# Patient Record
Sex: FEMALE | Race: WHITE | NOT HISPANIC OR LATINO | Employment: FULL TIME | URBAN - METROPOLITAN AREA
[De-identification: names, ages, dates, MRNs, and addresses within clinical notes are randomized per-mention and may not be internally consistent; named-entity substitution may affect disease eponyms.]

---

## 2018-11-27 ENCOUNTER — HOSPITAL ENCOUNTER (EMERGENCY)
Facility: HOSPITAL | Age: 38
Discharge: HOME | End: 2018-11-27
Attending: EMERGENCY MEDICINE | Admitting: EMERGENCY MEDICINE

## 2018-11-27 VITALS
DIASTOLIC BLOOD PRESSURE: 81 MMHG | RESPIRATION RATE: 18 BRPM | TEMPERATURE: 97.6 F | WEIGHT: 186 LBS | BODY MASS INDEX: 28.19 KG/M2 | SYSTOLIC BLOOD PRESSURE: 144 MMHG | HEART RATE: 88 BPM | HEIGHT: 68 IN | OXYGEN SATURATION: 96 %

## 2018-11-27 DIAGNOSIS — Z86.69 HISTORY OF CHIARI MALFORMATION: ICD-10-CM

## 2018-11-27 DIAGNOSIS — M43.6 TORTICOLLIS, ACUTE: Primary | ICD-10-CM

## 2018-11-27 PROCEDURE — 99282 EMERGENCY DEPT VISIT SF MDM: CPT

## 2018-11-27 PROCEDURE — 96372 THER/PROPH/DIAG INJ SC/IM: CPT

## 2018-11-27 PROCEDURE — 25010000002 KETOROLAC TROMETHAMINE PER 15 MG: Performed by: PHYSICIAN ASSISTANT

## 2018-11-27 RX ORDER — KETOROLAC TROMETHAMINE 30 MG/ML
60 INJECTION, SOLUTION INTRAMUSCULAR; INTRAVENOUS ONCE
Status: COMPLETED | OUTPATIENT
Start: 2018-11-27 | End: 2018-11-27

## 2018-11-27 RX ORDER — ONDANSETRON 4 MG/1
4 TABLET, ORALLY DISINTEGRATING ORAL EVERY 8 HOURS PRN
Qty: 12 TABLET | Refills: 0 | Status: SHIPPED | OUTPATIENT
Start: 2018-11-27

## 2018-11-27 RX ORDER — CYCLOBENZAPRINE HCL 10 MG
10 TABLET ORAL 3 TIMES DAILY PRN
Qty: 12 TABLET | Refills: 0 | Status: SHIPPED | OUTPATIENT
Start: 2018-11-27 | End: 2018-12-06

## 2018-11-27 RX ORDER — METHYLPREDNISOLONE 4 MG/1
TABLET ORAL
Qty: 21 TABLET | Refills: 0 | Status: SHIPPED | OUTPATIENT
Start: 2018-11-27 | End: 2019-04-25

## 2018-11-27 RX ADMIN — KETOROLAC TROMETHAMINE 60 MG: 30 INJECTION, SOLUTION INTRAMUSCULAR at 15:54

## 2019-04-25 ENCOUNTER — APPOINTMENT (OUTPATIENT)
Dept: GENERAL RADIOLOGY | Facility: HOSPITAL | Age: 39
End: 2019-04-25

## 2019-04-25 ENCOUNTER — HOSPITAL ENCOUNTER (INPATIENT)
Facility: HOSPITAL | Age: 39
LOS: 3 days | Discharge: LEFT BEFORE DISCHARGE | End: 2019-04-28
Attending: EMERGENCY MEDICINE | Admitting: SURGERY

## 2019-04-25 ENCOUNTER — APPOINTMENT (OUTPATIENT)
Dept: CT IMAGING | Facility: HOSPITAL | Age: 39
End: 2019-04-25

## 2019-04-25 DIAGNOSIS — R31.9 URINARY TRACT INFECTION WITH HEMATURIA, SITE UNSPECIFIED: ICD-10-CM

## 2019-04-25 DIAGNOSIS — K63.9 THICKENED SMALL BOWEL: ICD-10-CM

## 2019-04-25 DIAGNOSIS — K35.32 RUPTURED APPENDICITIS: ICD-10-CM

## 2019-04-25 DIAGNOSIS — A41.9 SEPSIS, DUE TO UNSPECIFIED ORGANISM: Primary | ICD-10-CM

## 2019-04-25 DIAGNOSIS — N39.0 URINARY TRACT INFECTION WITH HEMATURIA, SITE UNSPECIFIED: ICD-10-CM

## 2019-04-25 DIAGNOSIS — K57.92 DIVERTICULITIS: ICD-10-CM

## 2019-04-25 DIAGNOSIS — K66.8 FREE INTRAPERITONEAL AIR: ICD-10-CM

## 2019-04-25 PROBLEM — K35.80 ACUTE APPENDICITIS: Status: ACTIVE | Noted: 2019-04-25

## 2019-04-25 PROBLEM — J44.9 COPD (CHRONIC OBSTRUCTIVE PULMONARY DISEASE) (HCC): Status: ACTIVE | Noted: 2019-04-25

## 2019-04-25 PROBLEM — B19.20 HEPATITIS C: Status: ACTIVE | Noted: 2019-04-25

## 2019-04-25 PROBLEM — Z72.0 TOBACCO ABUSE: Status: ACTIVE | Noted: 2019-04-25

## 2019-04-25 LAB
ALBUMIN SERPL-MCNC: 3.9 G/DL (ref 3.5–5.2)
ALBUMIN/GLOB SERPL: 1.2 G/DL
ALP SERPL-CCNC: 165 U/L (ref 39–117)
ALT SERPL W P-5'-P-CCNC: 552 U/L (ref 1–33)
ANION GAP SERPL CALCULATED.3IONS-SCNC: 13 MMOL/L
AST SERPL-CCNC: 180 U/L (ref 1–32)
BACTERIA UR QL AUTO: ABNORMAL /HPF
BASOPHILS # BLD AUTO: 0.01 10*3/MM3 (ref 0–0.2)
BASOPHILS NFR BLD AUTO: 0 % (ref 0–1.5)
BILIRUB SERPL-MCNC: 2.3 MG/DL (ref 0.2–1.2)
BILIRUB UR QL STRIP: ABNORMAL
BUN BLD-MCNC: 16 MG/DL (ref 6–20)
BUN/CREAT SERPL: 27.6 (ref 7–25)
CALCIUM SPEC-SCNC: 9.6 MG/DL (ref 8.6–10.5)
CHLORIDE SERPL-SCNC: 102 MMOL/L (ref 98–107)
CLARITY UR: ABNORMAL
CO2 SERPL-SCNC: 26 MMOL/L (ref 22–29)
COLOR UR: ABNORMAL
CREAT BLD-MCNC: 0.58 MG/DL (ref 0.57–1)
DEPRECATED RDW RBC AUTO: 54.7 FL (ref 37–54)
EOSINOPHIL # BLD AUTO: 0.01 10*3/MM3 (ref 0–0.4)
EOSINOPHIL NFR BLD AUTO: 0 % (ref 0.3–6.2)
ERYTHROCYTE [DISTWIDTH] IN BLOOD BY AUTOMATED COUNT: 15.7 % (ref 12.3–15.4)
GFR SERPL CREATININE-BSD FRML MDRD: 118 ML/MIN/1.73
GLOBULIN UR ELPH-MCNC: 3.3 GM/DL
GLUCOSE BLD-MCNC: 166 MG/DL (ref 65–99)
GLUCOSE UR STRIP-MCNC: NEGATIVE MG/DL
HCT VFR BLD AUTO: 42.2 % (ref 34–46.6)
HGB BLD-MCNC: 14.2 G/DL (ref 12–15.9)
HGB UR QL STRIP.AUTO: NEGATIVE
HOLD SPECIMEN: NORMAL
HOLD SPECIMEN: NORMAL
HYALINE CASTS UR QL AUTO: ABNORMAL /LPF
IMM GRANULOCYTES # BLD AUTO: 0.09 10*3/MM3 (ref 0–0.05)
IMM GRANULOCYTES NFR BLD AUTO: 0.4 % (ref 0–0.5)
KETONES UR QL STRIP: NEGATIVE
LEUKOCYTE ESTERASE UR QL STRIP.AUTO: ABNORMAL
LIPASE SERPL-CCNC: 7 U/L (ref 13–60)
LYMPHOCYTES # BLD AUTO: 1.48 10*3/MM3 (ref 0.7–3.1)
LYMPHOCYTES NFR BLD AUTO: 6.4 % (ref 19.6–45.3)
MCH RBC QN AUTO: 32.2 PG (ref 26.6–33)
MCHC RBC AUTO-ENTMCNC: 33.6 G/DL (ref 31.5–35.7)
MCV RBC AUTO: 95.7 FL (ref 79–97)
MONOCYTES # BLD AUTO: 1.05 10*3/MM3 (ref 0.1–0.9)
MONOCYTES NFR BLD AUTO: 4.5 % (ref 5–12)
NEUTROPHILS # BLD AUTO: 20.73 10*3/MM3 (ref 1.7–7)
NEUTROPHILS NFR BLD AUTO: 89.1 % (ref 42.7–76)
NITRITE UR QL STRIP: POSITIVE
PH UR STRIP.AUTO: <=5 [PH] (ref 5–8)
PLATELET # BLD AUTO: 226 10*3/MM3 (ref 140–450)
PMV BLD AUTO: 11.5 FL (ref 6–12)
POTASSIUM BLD-SCNC: 3.5 MMOL/L (ref 3.5–5.2)
PROCALCITONIN SERPL-MCNC: 1.42 NG/ML (ref 0.1–0.25)
PROT SERPL-MCNC: 7.2 G/DL (ref 6–8.5)
PROT UR QL STRIP: NEGATIVE
RBC # BLD AUTO: 4.41 10*6/MM3 (ref 3.77–5.28)
RBC # UR: ABNORMAL /HPF
REF LAB TEST METHOD: ABNORMAL
SODIUM BLD-SCNC: 141 MMOL/L (ref 136–145)
SP GR UR STRIP: 1.03 (ref 1–1.03)
SQUAMOUS #/AREA URNS HPF: ABNORMAL /HPF
UROBILINOGEN UR QL STRIP: ABNORMAL
WBC NRBC COR # BLD: 23.28 10*3/MM3 (ref 3.4–10.8)
WBC UR QL AUTO: ABNORMAL /HPF
WHOLE BLOOD HOLD SPECIMEN: NORMAL
WHOLE BLOOD HOLD SPECIMEN: NORMAL

## 2019-04-25 PROCEDURE — 99223 1ST HOSP IP/OBS HIGH 75: CPT | Performed by: INTERNAL MEDICINE

## 2019-04-25 PROCEDURE — 83690 ASSAY OF LIPASE: CPT | Performed by: EMERGENCY MEDICINE

## 2019-04-25 PROCEDURE — 25010000002 HYDROMORPHONE PER 4 MG: Performed by: INTERNAL MEDICINE

## 2019-04-25 PROCEDURE — 87040 BLOOD CULTURE FOR BACTERIA: CPT | Performed by: PHYSICIAN ASSISTANT

## 2019-04-25 PROCEDURE — 25010000002 HYDROMORPHONE PER 4 MG: Performed by: EMERGENCY MEDICINE

## 2019-04-25 PROCEDURE — 25010000002 LORAZEPAM PER 2 MG: Performed by: INTERNAL MEDICINE

## 2019-04-25 PROCEDURE — 25010000002 VANCOMYCIN 10 G RECONSTITUTED SOLUTION: Performed by: PHYSICIAN ASSISTANT

## 2019-04-25 PROCEDURE — 25010000002 PIPERACILLIN SOD-TAZOBACTAM PER 1 G: Performed by: INTERNAL MEDICINE

## 2019-04-25 PROCEDURE — 25010000002 ONDANSETRON PER 1 MG: Performed by: PHYSICIAN ASSISTANT

## 2019-04-25 PROCEDURE — 74177 CT ABD & PELVIS W/CONTRAST: CPT

## 2019-04-25 PROCEDURE — 25010000002 ONDANSETRON PER 1 MG: Performed by: INTERNAL MEDICINE

## 2019-04-25 PROCEDURE — 71045 X-RAY EXAM CHEST 1 VIEW: CPT

## 2019-04-25 PROCEDURE — 84145 PROCALCITONIN (PCT): CPT | Performed by: PHYSICIAN ASSISTANT

## 2019-04-25 PROCEDURE — 25010000002 FLUCONAZOLE PER 200 MG: Performed by: INTERNAL MEDICINE

## 2019-04-25 PROCEDURE — 80053 COMPREHEN METABOLIC PANEL: CPT | Performed by: EMERGENCY MEDICINE

## 2019-04-25 PROCEDURE — 25010000002 PROMETHAZINE PER 50 MG: Performed by: INTERNAL MEDICINE

## 2019-04-25 PROCEDURE — 25010000002 IOPAMIDOL 61 % SOLUTION: Performed by: EMERGENCY MEDICINE

## 2019-04-25 PROCEDURE — 25010000002 PIPERACILLIN SOD-TAZOBACTAM PER 1 G: Performed by: PHYSICIAN ASSISTANT

## 2019-04-25 PROCEDURE — 85025 COMPLETE CBC W/AUTO DIFF WBC: CPT | Performed by: EMERGENCY MEDICINE

## 2019-04-25 PROCEDURE — 99285 EMERGENCY DEPT VISIT HI MDM: CPT

## 2019-04-25 PROCEDURE — 81001 URINALYSIS AUTO W/SCOPE: CPT | Performed by: EMERGENCY MEDICINE

## 2019-04-25 RX ORDER — ONDANSETRON 2 MG/ML
4 INJECTION INTRAMUSCULAR; INTRAVENOUS ONCE
Status: COMPLETED | OUTPATIENT
Start: 2019-04-25 | End: 2019-04-25

## 2019-04-25 RX ORDER — NICOTINE 21 MG/24HR
1 PATCH, TRANSDERMAL 24 HOURS TRANSDERMAL EVERY 24 HOURS
Status: DISCONTINUED | OUTPATIENT
Start: 2019-04-25 | End: 2019-04-28 | Stop reason: HOSPADM

## 2019-04-25 RX ORDER — SODIUM CHLORIDE 0.9 % (FLUSH) 0.9 %
3 SYRINGE (ML) INJECTION EVERY 12 HOURS SCHEDULED
Status: DISCONTINUED | OUTPATIENT
Start: 2019-04-25 | End: 2019-04-26 | Stop reason: SDUPTHER

## 2019-04-25 RX ORDER — SODIUM CHLORIDE 0.9 % (FLUSH) 0.9 %
3-10 SYRINGE (ML) INJECTION AS NEEDED
Status: DISCONTINUED | OUTPATIENT
Start: 2019-04-25 | End: 2019-04-26 | Stop reason: SDUPTHER

## 2019-04-25 RX ORDER — ERGOCALCIFEROL 1.25 MG/1
50000 CAPSULE ORAL
COMMUNITY

## 2019-04-25 RX ORDER — PROMETHAZINE HYDROCHLORIDE 25 MG/ML
12.5 INJECTION, SOLUTION INTRAMUSCULAR; INTRAVENOUS EVERY 6 HOURS PRN
Status: DISCONTINUED | OUTPATIENT
Start: 2019-04-25 | End: 2019-04-25

## 2019-04-25 RX ORDER — SODIUM CHLORIDE 0.9 % (FLUSH) 0.9 %
10 SYRINGE (ML) INJECTION AS NEEDED
Status: DISCONTINUED | OUTPATIENT
Start: 2019-04-25 | End: 2019-04-28 | Stop reason: HOSPADM

## 2019-04-25 RX ORDER — LORAZEPAM 2 MG/ML
1 INJECTION INTRAMUSCULAR EVERY 4 HOURS PRN
Status: DISCONTINUED | OUTPATIENT
Start: 2019-04-25 | End: 2019-04-28 | Stop reason: HOSPADM

## 2019-04-25 RX ORDER — HYDROMORPHONE HYDROCHLORIDE 1 MG/ML
0.5 INJECTION, SOLUTION INTRAMUSCULAR; INTRAVENOUS; SUBCUTANEOUS
Status: DISCONTINUED | OUTPATIENT
Start: 2019-04-25 | End: 2019-04-26 | Stop reason: SDUPTHER

## 2019-04-25 RX ORDER — ASPIRIN 81 MG/1
81 TABLET ORAL DAILY
COMMUNITY

## 2019-04-25 RX ORDER — HYDROMORPHONE HYDROCHLORIDE 1 MG/ML
0.5 INJECTION, SOLUTION INTRAMUSCULAR; INTRAVENOUS; SUBCUTANEOUS
Status: COMPLETED | OUTPATIENT
Start: 2019-04-25 | End: 2019-04-25

## 2019-04-25 RX ORDER — PREDNISONE 10 MG/1
TABLET ORAL DAILY
Status: ON HOLD | COMMUNITY
Start: 2019-04-22 | End: 2019-04-26

## 2019-04-25 RX ORDER — FLUCONAZOLE 2 MG/ML
400 INJECTION, SOLUTION INTRAVENOUS DAILY
Status: DISCONTINUED | OUTPATIENT
Start: 2019-04-25 | End: 2019-04-28

## 2019-04-25 RX ORDER — ONDANSETRON 2 MG/ML
4 INJECTION INTRAMUSCULAR; INTRAVENOUS EVERY 6 HOURS PRN
Status: DISCONTINUED | OUTPATIENT
Start: 2019-04-25 | End: 2019-04-28 | Stop reason: HOSPADM

## 2019-04-25 RX ORDER — SODIUM CHLORIDE 9 MG/ML
125 INJECTION, SOLUTION INTRAVENOUS CONTINUOUS
Status: DISCONTINUED | OUTPATIENT
Start: 2019-04-25 | End: 2019-04-28 | Stop reason: HOSPADM

## 2019-04-25 RX ADMIN — IOPAMIDOL 95 ML: 612 INJECTION, SOLUTION INTRAVENOUS at 12:41

## 2019-04-25 RX ADMIN — HYDROMORPHONE HYDROCHLORIDE 0.5 MG: 1 INJECTION, SOLUTION INTRAMUSCULAR; INTRAVENOUS; SUBCUTANEOUS at 14:15

## 2019-04-25 RX ADMIN — SODIUM CHLORIDE 2517 ML: 9 INJECTION, SOLUTION INTRAVENOUS at 11:32

## 2019-04-25 RX ADMIN — LORAZEPAM 1 MG: 2 INJECTION INTRAMUSCULAR; INTRAVENOUS at 21:56

## 2019-04-25 RX ADMIN — SODIUM CHLORIDE 100 ML/HR: 9 INJECTION, SOLUTION INTRAVENOUS at 15:38

## 2019-04-25 RX ADMIN — ONDANSETRON 4 MG: 2 INJECTION INTRAMUSCULAR; INTRAVENOUS at 18:02

## 2019-04-25 RX ADMIN — HYDROMORPHONE HYDROCHLORIDE 0.5 MG: 1 INJECTION, SOLUTION INTRAMUSCULAR; INTRAVENOUS; SUBCUTANEOUS at 21:56

## 2019-04-25 RX ADMIN — HYDROMORPHONE HYDROCHLORIDE 0.5 MG: 1 INJECTION, SOLUTION INTRAMUSCULAR; INTRAVENOUS; SUBCUTANEOUS at 17:35

## 2019-04-25 RX ADMIN — FLUCONAZOLE 400 MG: 2 INJECTION, SOLUTION INTRAVENOUS at 15:38

## 2019-04-25 RX ADMIN — TAZOBACTAM SODIUM AND PIPERACILLIN SODIUM 4.5 G: 500; 4 INJECTION, SOLUTION INTRAVENOUS at 18:57

## 2019-04-25 RX ADMIN — TAZOBACTAM SODIUM AND PIPERACILLIN SODIUM 3.38 G: 375; 3 INJECTION, SOLUTION INTRAVENOUS at 11:43

## 2019-04-25 RX ADMIN — VANCOMYCIN HYDROCHLORIDE 1250 MG: 10 INJECTION, POWDER, LYOPHILIZED, FOR SOLUTION INTRAVENOUS at 12:11

## 2019-04-25 RX ADMIN — ONDANSETRON 4 MG: 2 INJECTION INTRAMUSCULAR; INTRAVENOUS at 11:36

## 2019-04-25 RX ADMIN — HYDROMORPHONE HYDROCHLORIDE 0.5 MG: 1 INJECTION, SOLUTION INTRAMUSCULAR; INTRAVENOUS; SUBCUTANEOUS at 11:44

## 2019-04-26 ENCOUNTER — ANESTHESIA EVENT (OUTPATIENT)
Dept: PERIOP | Facility: HOSPITAL | Age: 39
End: 2019-04-26

## 2019-04-26 ENCOUNTER — ANESTHESIA (OUTPATIENT)
Dept: PERIOP | Facility: HOSPITAL | Age: 39
End: 2019-04-26

## 2019-04-26 PROBLEM — N39.0 ACUTE UTI (URINARY TRACT INFECTION): Status: ACTIVE | Noted: 2019-04-26

## 2019-04-26 PROBLEM — K52.9 ENTERITIS: Status: ACTIVE | Noted: 2019-04-26

## 2019-04-26 LAB
DEPRECATED RDW RBC AUTO: 58.1 FL (ref 37–54)
ERYTHROCYTE [DISTWIDTH] IN BLOOD BY AUTOMATED COUNT: 16.2 % (ref 12.3–15.4)
HCT VFR BLD AUTO: 37.8 % (ref 34–46.6)
HGB BLD-MCNC: 12.3 G/DL (ref 12–15.9)
MCH RBC QN AUTO: 31.8 PG (ref 26.6–33)
MCHC RBC AUTO-ENTMCNC: 32.5 G/DL (ref 31.5–35.7)
MCV RBC AUTO: 97.7 FL (ref 79–97)
PLATELET # BLD AUTO: 210 10*3/MM3 (ref 140–450)
PMV BLD AUTO: 11.4 FL (ref 6–12)
POTASSIUM BLD-SCNC: 3.3 MMOL/L (ref 3.5–5.2)
POTASSIUM BLD-SCNC: 3.7 MMOL/L (ref 3.5–5.2)
RBC # BLD AUTO: 3.87 10*6/MM3 (ref 3.77–5.28)
WBC NRBC COR # BLD: 15.36 10*3/MM3 (ref 3.4–10.8)

## 2019-04-26 PROCEDURE — 99233 SBSQ HOSP IP/OBS HIGH 50: CPT | Performed by: INTERNAL MEDICINE

## 2019-04-26 PROCEDURE — 25010000002 MORPHINE PER 10 MG: Performed by: SURGERY

## 2019-04-26 PROCEDURE — 88307 TISSUE EXAM BY PATHOLOGIST: CPT | Performed by: SURGERY

## 2019-04-26 PROCEDURE — 25010000002 PHENYLEPHRINE PER 1 ML: Performed by: NURSE ANESTHETIST, CERTIFIED REGISTERED

## 2019-04-26 PROCEDURE — 84132 ASSAY OF SERUM POTASSIUM: CPT | Performed by: NURSE PRACTITIONER

## 2019-04-26 PROCEDURE — 25010000002 BUPRENORPHINE PER 0.1 MG: Performed by: ANESTHESIOLOGY

## 2019-04-26 PROCEDURE — 93005 ELECTROCARDIOGRAM TRACING: CPT | Performed by: ANESTHESIOLOGY

## 2019-04-26 PROCEDURE — 0D1N0Z4 BYPASS SIGMOID COLON TO CUTANEOUS, OPEN APPROACH: ICD-10-PCS | Performed by: SURGERY

## 2019-04-26 PROCEDURE — 25010000002 NEOSTIGMINE 10 MG/10ML SOLUTION: Performed by: ANESTHESIOLOGY

## 2019-04-26 PROCEDURE — 25010000002 PIPERACILLIN SOD-TAZOBACTAM PER 1 G: Performed by: INTERNAL MEDICINE

## 2019-04-26 PROCEDURE — 0DJD4ZZ INSPECTION OF LOWER INTESTINAL TRACT, PERCUTANEOUS ENDOSCOPIC APPROACH: ICD-10-PCS | Performed by: SURGERY

## 2019-04-26 PROCEDURE — 25010000002 FLUCONAZOLE PER 200 MG: Performed by: INTERNAL MEDICINE

## 2019-04-26 PROCEDURE — 25010000002 FENTANYL CITRATE (PF) 100 MCG/2ML SOLUTION: Performed by: NURSE ANESTHETIST, CERTIFIED REGISTERED

## 2019-04-26 PROCEDURE — 25010000002 ONDANSETRON PER 1 MG: Performed by: INTERNAL MEDICINE

## 2019-04-26 PROCEDURE — 25010000002 PROPOFOL 1000 MG/ML EMULSION: Performed by: NURSE ANESTHETIST, CERTIFIED REGISTERED

## 2019-04-26 PROCEDURE — 25010000002 SUCCINYLCHOLINE PER 20 MG: Performed by: NURSE ANESTHETIST, CERTIFIED REGISTERED

## 2019-04-26 PROCEDURE — 25010000002 PROPOFOL 10 MG/ML EMULSION: Performed by: NURSE ANESTHETIST, CERTIFIED REGISTERED

## 2019-04-26 PROCEDURE — 84132 ASSAY OF SERUM POTASSIUM: CPT | Performed by: ANESTHESIOLOGY

## 2019-04-26 PROCEDURE — 25010000002 DEXAMETHASONE SODIUM PHOSPHATE 10 MG/ML SOLUTION: Performed by: ANESTHESIOLOGY

## 2019-04-26 PROCEDURE — 85027 COMPLETE CBC AUTOMATED: CPT | Performed by: INTERNAL MEDICINE

## 2019-04-26 PROCEDURE — 93010 ELECTROCARDIOGRAM REPORT: CPT | Performed by: INTERNAL MEDICINE

## 2019-04-26 PROCEDURE — 87086 URINE CULTURE/COLONY COUNT: CPT | Performed by: SURGERY

## 2019-04-26 PROCEDURE — 25010000002 HYDROMORPHONE PER 4 MG: Performed by: INTERNAL MEDICINE

## 2019-04-26 PROCEDURE — 0DTN0ZZ RESECTION OF SIGMOID COLON, OPEN APPROACH: ICD-10-PCS | Performed by: SURGERY

## 2019-04-26 PROCEDURE — 25010000002 METHYLNALTREXONE 12 MG/0.6ML SOLUTION: Performed by: SURGERY

## 2019-04-26 DEVICE — CONTOUR CURVED CUTTER STAPLER RELOAD
Type: IMPLANTABLE DEVICE | Site: SIGMOID COLON | Status: FUNCTIONAL
Brand: CONTOUR

## 2019-04-26 RX ORDER — LABETALOL HYDROCHLORIDE 5 MG/ML
5 INJECTION, SOLUTION INTRAVENOUS
Status: DISCONTINUED | OUTPATIENT
Start: 2019-04-26 | End: 2019-04-26 | Stop reason: HOSPADM

## 2019-04-26 RX ORDER — LIDOCAINE HYDROCHLORIDE 10 MG/ML
INJECTION, SOLUTION EPIDURAL; INFILTRATION; INTRACAUDAL; PERINEURAL AS NEEDED
Status: DISCONTINUED | OUTPATIENT
Start: 2019-04-26 | End: 2019-04-26 | Stop reason: SURG

## 2019-04-26 RX ORDER — FAMOTIDINE 10 MG/ML
20 INJECTION, SOLUTION INTRAVENOUS ONCE
Status: COMPLETED | OUTPATIENT
Start: 2019-04-26 | End: 2019-04-26

## 2019-04-26 RX ORDER — PROMETHAZINE HYDROCHLORIDE 25 MG/ML
6.25 INJECTION, SOLUTION INTRAMUSCULAR; INTRAVENOUS ONCE AS NEEDED
Status: DISCONTINUED | OUTPATIENT
Start: 2019-04-26 | End: 2019-04-26 | Stop reason: HOSPADM

## 2019-04-26 RX ORDER — SODIUM CHLORIDE 0.9 % (FLUSH) 0.9 %
3 SYRINGE (ML) INJECTION EVERY 12 HOURS SCHEDULED
Status: DISCONTINUED | OUTPATIENT
Start: 2019-04-26 | End: 2019-04-26 | Stop reason: HOSPADM

## 2019-04-26 RX ORDER — CARISOPRODOL 350 MG/1
350 TABLET ORAL EVERY 8 HOURS PRN
Status: DISCONTINUED | OUTPATIENT
Start: 2019-04-26 | End: 2019-04-28 | Stop reason: HOSPADM

## 2019-04-26 RX ORDER — MORPHINE SULFATE 1 MG/ML
INJECTION INTRAVENOUS CONTINUOUS
Status: DISCONTINUED | OUTPATIENT
Start: 2019-04-26 | End: 2019-04-28

## 2019-04-26 RX ORDER — FAMOTIDINE 20 MG/1
20 TABLET, FILM COATED ORAL ONCE
Status: CANCELLED | OUTPATIENT
Start: 2019-04-26 | End: 2019-04-26

## 2019-04-26 RX ORDER — ONDANSETRON 2 MG/ML
4 INJECTION INTRAMUSCULAR; INTRAVENOUS ONCE AS NEEDED
Status: DISCONTINUED | OUTPATIENT
Start: 2019-04-26 | End: 2019-04-26 | Stop reason: HOSPADM

## 2019-04-26 RX ORDER — SODIUM CHLORIDE 0.9 % (FLUSH) 0.9 %
3-10 SYRINGE (ML) INJECTION AS NEEDED
Status: DISCONTINUED | OUTPATIENT
Start: 2019-04-26 | End: 2019-04-26 | Stop reason: HOSPADM

## 2019-04-26 RX ORDER — PROMETHAZINE HYDROCHLORIDE 25 MG/1
25 TABLET ORAL ONCE AS NEEDED
Status: DISCONTINUED | OUTPATIENT
Start: 2019-04-26 | End: 2019-04-26 | Stop reason: HOSPADM

## 2019-04-26 RX ORDER — HYDROMORPHONE HYDROCHLORIDE 1 MG/ML
0.5 INJECTION, SOLUTION INTRAMUSCULAR; INTRAVENOUS; SUBCUTANEOUS
Status: DISCONTINUED | OUTPATIENT
Start: 2019-04-26 | End: 2019-04-26 | Stop reason: SDUPTHER

## 2019-04-26 RX ORDER — NALOXONE HCL 0.4 MG/ML
0.1 VIAL (ML) INJECTION
Status: DISCONTINUED | OUTPATIENT
Start: 2019-04-26 | End: 2019-04-28 | Stop reason: HOSPADM

## 2019-04-26 RX ORDER — FENTANYL CITRATE 50 UG/ML
50 INJECTION, SOLUTION INTRAMUSCULAR; INTRAVENOUS
Status: DISCONTINUED | OUTPATIENT
Start: 2019-04-26 | End: 2019-04-26 | Stop reason: HOSPADM

## 2019-04-26 RX ORDER — BUPIVACAINE HYDROCHLORIDE 2.5 MG/ML
INJECTION, SOLUTION EPIDURAL; INFILTRATION; INTRACAUDAL
Status: COMPLETED | OUTPATIENT
Start: 2019-04-26 | End: 2019-04-26

## 2019-04-26 RX ORDER — FENTANYL CITRATE 50 UG/ML
50 INJECTION, SOLUTION INTRAMUSCULAR; INTRAVENOUS
Status: DISCONTINUED | OUTPATIENT
Start: 2019-04-26 | End: 2019-04-26 | Stop reason: SDUPTHER

## 2019-04-26 RX ORDER — POTASSIUM CHLORIDE 1.5 G/1.77G
40 POWDER, FOR SOLUTION ORAL AS NEEDED
Status: DISCONTINUED | OUTPATIENT
Start: 2019-04-26 | End: 2019-04-28 | Stop reason: HOSPADM

## 2019-04-26 RX ORDER — LIDOCAINE HYDROCHLORIDE 10 MG/ML
INJECTION, SOLUTION EPIDURAL; INFILTRATION; INTRACAUDAL; PERINEURAL AS NEEDED
Status: DISCONTINUED | OUTPATIENT
Start: 2019-04-26 | End: 2019-04-26 | Stop reason: HOSPADM

## 2019-04-26 RX ORDER — ALBUTEROL SULFATE 90 UG/1
AEROSOL, METERED RESPIRATORY (INHALATION) AS NEEDED
Status: DISCONTINUED | OUTPATIENT
Start: 2019-04-26 | End: 2019-04-26 | Stop reason: SURG

## 2019-04-26 RX ORDER — SODIUM CHLORIDE 0.9 % (FLUSH) 0.9 %
1-10 SYRINGE (ML) INJECTION AS NEEDED
Status: DISCONTINUED | OUTPATIENT
Start: 2019-04-26 | End: 2019-04-26 | Stop reason: HOSPADM

## 2019-04-26 RX ORDER — BUPRENORPHINE HYDROCHLORIDE 0.32 MG/ML
INJECTION INTRAMUSCULAR; INTRAVENOUS
Status: COMPLETED | OUTPATIENT
Start: 2019-04-26 | End: 2019-04-26

## 2019-04-26 RX ORDER — SODIUM CHLORIDE, SODIUM LACTATE, POTASSIUM CHLORIDE, CALCIUM CHLORIDE 600; 310; 30; 20 MG/100ML; MG/100ML; MG/100ML; MG/100ML
9 INJECTION, SOLUTION INTRAVENOUS CONTINUOUS
Status: DISCONTINUED | OUTPATIENT
Start: 2019-04-26 | End: 2019-04-26

## 2019-04-26 RX ORDER — IPRATROPIUM BROMIDE AND ALBUTEROL SULFATE 2.5; .5 MG/3ML; MG/3ML
3 SOLUTION RESPIRATORY (INHALATION) ONCE AS NEEDED
Status: DISCONTINUED | OUTPATIENT
Start: 2019-04-26 | End: 2019-04-26 | Stop reason: HOSPADM

## 2019-04-26 RX ORDER — MAGNESIUM HYDROXIDE 1200 MG/15ML
LIQUID ORAL AS NEEDED
Status: DISCONTINUED | OUTPATIENT
Start: 2019-04-26 | End: 2019-04-26 | Stop reason: HOSPADM

## 2019-04-26 RX ORDER — ROCURONIUM BROMIDE 10 MG/ML
INJECTION, SOLUTION INTRAVENOUS AS NEEDED
Status: DISCONTINUED | OUTPATIENT
Start: 2019-04-26 | End: 2019-04-26 | Stop reason: SURG

## 2019-04-26 RX ORDER — POTASSIUM CHLORIDE 750 MG/1
40 CAPSULE, EXTENDED RELEASE ORAL AS NEEDED
Status: DISCONTINUED | OUTPATIENT
Start: 2019-04-26 | End: 2019-04-28 | Stop reason: HOSPADM

## 2019-04-26 RX ORDER — NALOXONE HCL 0.4 MG/ML
0.4 VIAL (ML) INJECTION AS NEEDED
Status: DISCONTINUED | OUTPATIENT
Start: 2019-04-26 | End: 2019-04-26 | Stop reason: HOSPADM

## 2019-04-26 RX ORDER — HYDROCODONE BITARTRATE AND ACETAMINOPHEN 5; 325 MG/1; MG/1
1 TABLET ORAL EVERY 6 HOURS PRN
Status: DISCONTINUED | OUTPATIENT
Start: 2019-04-26 | End: 2019-04-28 | Stop reason: HOSPADM

## 2019-04-26 RX ORDER — POTASSIUM CHLORIDE 7.45 MG/ML
10 INJECTION INTRAVENOUS
Status: DISCONTINUED | OUTPATIENT
Start: 2019-04-26 | End: 2019-04-28 | Stop reason: HOSPADM

## 2019-04-26 RX ORDER — FENTANYL CITRATE 50 UG/ML
INJECTION, SOLUTION INTRAMUSCULAR; INTRAVENOUS AS NEEDED
Status: DISCONTINUED | OUTPATIENT
Start: 2019-04-26 | End: 2019-04-26 | Stop reason: SURG

## 2019-04-26 RX ORDER — PROMETHAZINE HYDROCHLORIDE 25 MG/1
25 SUPPOSITORY RECTAL ONCE AS NEEDED
Status: DISCONTINUED | OUTPATIENT
Start: 2019-04-26 | End: 2019-04-26 | Stop reason: HOSPADM

## 2019-04-26 RX ORDER — PROPOFOL 10 MG/ML
VIAL (ML) INTRAVENOUS AS NEEDED
Status: DISCONTINUED | OUTPATIENT
Start: 2019-04-26 | End: 2019-04-26 | Stop reason: SURG

## 2019-04-26 RX ORDER — GLYCOPYRROLATE 0.2 MG/ML
INJECTION INTRAMUSCULAR; INTRAVENOUS AS NEEDED
Status: DISCONTINUED | OUTPATIENT
Start: 2019-04-26 | End: 2019-04-26 | Stop reason: SURG

## 2019-04-26 RX ORDER — SODIUM CHLORIDE 9 MG/ML
INJECTION, SOLUTION INTRAVENOUS AS NEEDED
Status: DISCONTINUED | OUTPATIENT
Start: 2019-04-26 | End: 2019-04-26 | Stop reason: HOSPADM

## 2019-04-26 RX ORDER — CELECOXIB 200 MG/1
200 CAPSULE ORAL EVERY 12 HOURS SCHEDULED
Status: DISCONTINUED | OUTPATIENT
Start: 2019-04-26 | End: 2019-04-28 | Stop reason: HOSPADM

## 2019-04-26 RX ORDER — NEOSTIGMINE METHYLSULFATE 1 MG/ML
INJECTION, SOLUTION INTRAVENOUS AS NEEDED
Status: DISCONTINUED | OUTPATIENT
Start: 2019-04-26 | End: 2019-04-26 | Stop reason: SURG

## 2019-04-26 RX ORDER — SUCCINYLCHOLINE CHLORIDE 20 MG/ML
INJECTION INTRAMUSCULAR; INTRAVENOUS AS NEEDED
Status: DISCONTINUED | OUTPATIENT
Start: 2019-04-26 | End: 2019-04-26 | Stop reason: SURG

## 2019-04-26 RX ORDER — LIDOCAINE HYDROCHLORIDE 10 MG/ML
0.5 INJECTION, SOLUTION EPIDURAL; INFILTRATION; INTRACAUDAL; PERINEURAL ONCE AS NEEDED
Status: DISCONTINUED | OUTPATIENT
Start: 2019-04-26 | End: 2019-04-26 | Stop reason: HOSPADM

## 2019-04-26 RX ORDER — HYDROMORPHONE HYDROCHLORIDE 1 MG/ML
0.5 INJECTION, SOLUTION INTRAMUSCULAR; INTRAVENOUS; SUBCUTANEOUS
Status: DISCONTINUED | OUTPATIENT
Start: 2019-04-26 | End: 2019-04-26 | Stop reason: HOSPADM

## 2019-04-26 RX ORDER — DEXAMETHASONE SODIUM PHOSPHATE 10 MG/ML
INJECTION, SOLUTION INTRAMUSCULAR; INTRAVENOUS
Status: COMPLETED | OUTPATIENT
Start: 2019-04-26 | End: 2019-04-26

## 2019-04-26 RX ORDER — HYDRALAZINE HYDROCHLORIDE 20 MG/ML
5 INJECTION INTRAMUSCULAR; INTRAVENOUS
Status: DISCONTINUED | OUTPATIENT
Start: 2019-04-26 | End: 2019-04-26 | Stop reason: HOSPADM

## 2019-04-26 RX ORDER — HYDROCODONE BITARTRATE AND ACETAMINOPHEN 5; 325 MG/1; MG/1
1 TABLET ORAL ONCE AS NEEDED
Status: DISCONTINUED | OUTPATIENT
Start: 2019-04-26 | End: 2019-04-26 | Stop reason: HOSPADM

## 2019-04-26 RX ORDER — ONDANSETRON 2 MG/ML
4 INJECTION INTRAMUSCULAR; INTRAVENOUS ONCE AS NEEDED
Status: DISCONTINUED | OUTPATIENT
Start: 2019-04-26 | End: 2019-04-26 | Stop reason: SDUPTHER

## 2019-04-26 RX ADMIN — NEOSTIGMINE METHYLSULFATE 3 MG: 1 INJECTION, SOLUTION INTRAVENOUS at 17:28

## 2019-04-26 RX ADMIN — SODIUM CHLORIDE 125 ML/HR: 9 INJECTION, SOLUTION INTRAVENOUS at 19:07

## 2019-04-26 RX ADMIN — SODIUM CHLORIDE 125 ML/HR: 9 INJECTION, SOLUTION INTRAVENOUS at 07:29

## 2019-04-26 RX ADMIN — SODIUM CHLORIDE, POTASSIUM CHLORIDE, SODIUM LACTATE AND CALCIUM CHLORIDE 9 ML/HR: 600; 310; 30; 20 INJECTION, SOLUTION INTRAVENOUS at 14:25

## 2019-04-26 RX ADMIN — METHYLNALTREXONE BROMIDE 6 MG: 12 INJECTION, SOLUTION SUBCUTANEOUS at 19:57

## 2019-04-26 RX ADMIN — SODIUM CHLORIDE, PRESERVATIVE FREE 3 ML: 5 INJECTION INTRAVENOUS at 08:26

## 2019-04-26 RX ADMIN — PROPOFOL 25 MCG/KG/MIN: 10 INJECTION, EMULSION INTRAVENOUS at 15:00

## 2019-04-26 RX ADMIN — ROCURONIUM BROMIDE 20 MG: 10 SOLUTION INTRAVENOUS at 16:34

## 2019-04-26 RX ADMIN — FAMOTIDINE 20 MG: 10 INJECTION, SOLUTION INTRAVENOUS at 14:20

## 2019-04-26 RX ADMIN — ROCURONIUM BROMIDE 20 MG: 10 SOLUTION INTRAVENOUS at 15:40

## 2019-04-26 RX ADMIN — TAZOBACTAM SODIUM AND PIPERACILLIN SODIUM 4.5 G: 500; 4 INJECTION, SOLUTION INTRAVENOUS at 09:03

## 2019-04-26 RX ADMIN — ROCURONIUM BROMIDE 5 MG: 10 SOLUTION INTRAVENOUS at 14:55

## 2019-04-26 RX ADMIN — ALBUTEROL SULFATE 3 PUFF: 90 AEROSOL, METERED RESPIRATORY (INHALATION) at 14:57

## 2019-04-26 RX ADMIN — CELECOXIB 200 MG: 200 CAPSULE ORAL at 19:57

## 2019-04-26 RX ADMIN — HYDROMORPHONE HYDROCHLORIDE 0.5 MG: 1 INJECTION, SOLUTION INTRAMUSCULAR; INTRAVENOUS; SUBCUTANEOUS at 05:33

## 2019-04-26 RX ADMIN — FENTANYL CITRATE 50 MCG: 50 INJECTION, SOLUTION INTRAMUSCULAR; INTRAVENOUS at 14:50

## 2019-04-26 RX ADMIN — MORPHINE SULFATE: 1 INJECTION INTRAVENOUS at 19:58

## 2019-04-26 RX ADMIN — FLUCONAZOLE 400 MG: 2 INJECTION, SOLUTION INTRAVENOUS at 08:25

## 2019-04-26 RX ADMIN — ONDANSETRON 4 MG: 2 INJECTION INTRAMUSCULAR; INTRAVENOUS at 10:22

## 2019-04-26 RX ADMIN — TAZOBACTAM SODIUM AND PIPERACILLIN SODIUM 4.5 G: 500; 4 INJECTION, SOLUTION INTRAVENOUS at 01:15

## 2019-04-26 RX ADMIN — SUCCINYLCHOLINE CHLORIDE 100 MG: 20 INJECTION, SOLUTION INTRAMUSCULAR; INTRAVENOUS; PARENTERAL at 14:56

## 2019-04-26 RX ADMIN — BUPRENORPHINE HYDROCHLORIDE 0.3 MG: 0.32 INJECTION INTRAMUSCULAR; INTRAVENOUS at 17:30

## 2019-04-26 RX ADMIN — SODIUM CHLORIDE 125 ML/HR: 9 INJECTION, SOLUTION INTRAVENOUS at 00:52

## 2019-04-26 RX ADMIN — ROCURONIUM BROMIDE 25 MG: 10 SOLUTION INTRAVENOUS at 15:10

## 2019-04-26 RX ADMIN — LIDOCAINE HYDROCHLORIDE 50 MG: 10 INJECTION, SOLUTION EPIDURAL; INFILTRATION; INTRACAUDAL; PERINEURAL at 14:56

## 2019-04-26 RX ADMIN — TAZOBACTAM SODIUM AND PIPERACILLIN SODIUM 4.5 G: 500; 4 INJECTION, SOLUTION INTRAVENOUS at 22:05

## 2019-04-26 RX ADMIN — ONDANSETRON 4 MG: 2 INJECTION INTRAMUSCULAR; INTRAVENOUS at 17:09

## 2019-04-26 RX ADMIN — GLYCOPYRROLATE 0.4 MG: 0.2 INJECTION, SOLUTION INTRAMUSCULAR; INTRAVENOUS at 17:28

## 2019-04-26 RX ADMIN — HYDROMORPHONE HYDROCHLORIDE 0.5 MG: 1 INJECTION, SOLUTION INTRAMUSCULAR; INTRAVENOUS; SUBCUTANEOUS at 10:11

## 2019-04-26 RX ADMIN — BUPIVACAINE HYDROCHLORIDE 60 ML: 2.5 INJECTION, SOLUTION EPIDURAL; INFILTRATION; INTRACAUDAL; PERINEURAL at 17:30

## 2019-04-26 RX ADMIN — PROPOFOL 200 MG: 10 INJECTION, EMULSION INTRAVENOUS at 14:56

## 2019-04-26 RX ADMIN — DEXAMETHASONE SODIUM PHOSPHATE 2 MG: 10 INJECTION INTRAMUSCULAR; INTRAVENOUS at 17:30

## 2019-04-26 RX ADMIN — PHENYLEPHRINE HYDROCHLORIDE 80 MCG: 10 INJECTION INTRAVENOUS at 15:04

## 2019-04-26 NOTE — ANESTHESIA PROCEDURE NOTES
Airway  Urgency: elective    Airway not difficult    General Information and Staff    Patient location during procedure: OR    Indications and Patient Condition  Indications for airway management: airway protection    Preoxygenated: yes  MILS not maintained throughout  Mask difficulty assessment: 1 - vent by mask    Final Airway Details  Final airway type: endotracheal airway      Successful airway: ETT  Cuffed: yes   Successful intubation technique: direct laryngoscopy  Endotracheal tube insertion site: oral  Blade: Patricio  Blade size: 3  ETT size (mm): 7.0  Cormack-Lehane Classification: grade I - full view of glottis  Placement verified by: chest auscultation and capnometry   Measured from: lips  ETT to lips (cm): 20  Number of attempts at approach: 1    Additional Comments  Prophylactic RSI     Negative epigastric sounds, Breath sound equal bilaterally with symmetric chest rise and fall

## 2019-04-26 NOTE — ANESTHESIA PREPROCEDURE EVALUATION
Anesthesia Evaluation     Patient summary reviewed and Nursing notes reviewed   NPO Solid Status: > 8 hours  NPO Liquid Status: > 8 hours           Airway   Mallampati: II  TM distance: >3 FB  Neck ROM: full  No difficulty expected  Dental - normal exam     Pulmonary    (+) decreased breath sounds,   Cardiovascular   Exercise tolerance: good (4-7 METS)    Rhythm: regular  Rate: normal        Neuro/Psych  GI/Hepatic/Renal/Endo    (+)   liver disease,     Musculoskeletal     Abdominal    Substance History      OB/GYN          Other        ROS/Med Hx Other: Hep c positive                  Anesthesia Plan    ASA 3 - emergent     intravenous induction   Anesthetic plan, all risks, benefits, and alternatives have been provided, discussed and informed consent has been obtained with: patient.    Notre active hepatitis

## 2019-04-26 NOTE — ANESTHESIA PROCEDURE NOTES
Peripheral Block    Pre-sedation assessment completed: 4/26/2019 5:20 PM    Patient location during procedure: OR  Start time: 4/26/2019 5:20 PM  Stop time: 4/26/2019 5:35 PM  Reason for block: at surgeon's request and post-op pain management  Performed by  Anesthesiologist: Dhaval Huang MD  CRNA: Leticia Juarez CRNA  Preanesthetic Checklist  Completed: patient identified, site marked, surgical consent, pre-op evaluation, timeout performed, IV checked, risks and benefits discussed and monitors and equipment checked  Prep:  Pt Position: supine  Sterile barriers:cap, gloves, sterile barriers and mask  Prep: ChloraPrep  Patient monitoring: blood pressure monitoring, continuous pulse oximetry and EKG  Procedure  Sedation:yes  Performed under: general  Guidance:ultrasound guided  Images:still images obtained    Laterality:Bilateral  Block Type:TAP  Injection Technique:single-shot  Needle Type:short-bevel and echogenic  Needle Gauge:20 G    Medications Used: buprenorphine (BUPRENEX) injection, 0.3 mg  dexamethasone sodium phosphate injection, 2 mg  bupivacaine PF (MARCAINE) 0.25 % injection, 60 mL  Med admintered at 4/26/2019 5:30 PM  Medications  Preservative Free Saline:10ml  Comment:Block Injection:  LA dose divided between Right and Left block       Adjuncts:  Decadron 4mg PSF, Buprenex 0.3mg (Per total volume of LA)    Post Assessment  Injection Assessment: negative aspiration for heme, incremental injection and no paresthesia on injection  Patient Tolerance:comfortable throughout block  Complications:no  Additional Notes      Under Ultrasound guidance, a BBraun 4inch 360 degree needle was advanced with Normal Saline hydro dissection of tissue.  The Internal Oblique and Transversus Abdominus muscles where visualized.  At or before the aponeurosis of Internal Oblique, local anesthetic spread was visualized in the Transversus Abdominus Plane. Injection was made incrementally with aspiration every 5 mls.   There was no  intravascular injection,  injection pressure was normal, there was no neural injection, and the procedure was completed without difficulty.  Thank You.

## 2019-04-27 LAB
ANION GAP SERPL CALCULATED.3IONS-SCNC: 9 MMOL/L
BACTERIA SPEC AEROBE CULT: NO GROWTH
BUN BLD-MCNC: 10 MG/DL (ref 6–20)
BUN/CREAT SERPL: 20.4 (ref 7–25)
CALCIUM SPEC-SCNC: 8.7 MG/DL (ref 8.6–10.5)
CHLORIDE SERPL-SCNC: 104 MMOL/L (ref 98–107)
CO2 SERPL-SCNC: 26 MMOL/L (ref 22–29)
CREAT BLD-MCNC: 0.49 MG/DL (ref 0.57–1)
DEPRECATED RDW RBC AUTO: 53.8 FL (ref 37–54)
ERYTHROCYTE [DISTWIDTH] IN BLOOD BY AUTOMATED COUNT: 15.5 % (ref 12.3–15.4)
GFR SERPL CREATININE-BSD FRML MDRD: 143 ML/MIN/1.73
GLUCOSE BLD-MCNC: 136 MG/DL (ref 65–99)
HCT VFR BLD AUTO: 33.4 % (ref 34–46.6)
HGB BLD-MCNC: 11.2 G/DL (ref 12–15.9)
MCH RBC QN AUTO: 32 PG (ref 26.6–33)
MCHC RBC AUTO-ENTMCNC: 33.5 G/DL (ref 31.5–35.7)
MCV RBC AUTO: 95.4 FL (ref 79–97)
PLATELET # BLD AUTO: 175 10*3/MM3 (ref 140–450)
PMV BLD AUTO: 11 FL (ref 6–12)
POTASSIUM BLD-SCNC: 3.7 MMOL/L (ref 3.5–5.2)
RBC # BLD AUTO: 3.5 10*6/MM3 (ref 3.77–5.28)
SODIUM BLD-SCNC: 139 MMOL/L (ref 136–145)
WBC NRBC COR # BLD: 11.47 10*3/MM3 (ref 3.4–10.8)

## 2019-04-27 PROCEDURE — 80048 BASIC METABOLIC PNL TOTAL CA: CPT | Performed by: INTERNAL MEDICINE

## 2019-04-27 PROCEDURE — 85027 COMPLETE CBC AUTOMATED: CPT | Performed by: INTERNAL MEDICINE

## 2019-04-27 PROCEDURE — 25010000002 PIPERACILLIN SOD-TAZOBACTAM PER 1 G: Performed by: INTERNAL MEDICINE

## 2019-04-27 PROCEDURE — 25010000002 FLUCONAZOLE PER 200 MG: Performed by: INTERNAL MEDICINE

## 2019-04-27 PROCEDURE — 99233 SBSQ HOSP IP/OBS HIGH 50: CPT | Performed by: INTERNAL MEDICINE

## 2019-04-27 RX ADMIN — CELECOXIB 200 MG: 200 CAPSULE ORAL at 20:26

## 2019-04-27 RX ADMIN — SODIUM CHLORIDE 125 ML/HR: 9 INJECTION, SOLUTION INTRAVENOUS at 19:34

## 2019-04-27 RX ADMIN — SODIUM CHLORIDE 125 ML/HR: 9 INJECTION, SOLUTION INTRAVENOUS at 09:15

## 2019-04-27 RX ADMIN — CELECOXIB 200 MG: 200 CAPSULE ORAL at 09:15

## 2019-04-27 RX ADMIN — TAZOBACTAM SODIUM AND PIPERACILLIN SODIUM 4.5 G: 500; 4 INJECTION, SOLUTION INTRAVENOUS at 20:27

## 2019-04-27 RX ADMIN — FLUCONAZOLE 400 MG: 2 INJECTION, SOLUTION INTRAVENOUS at 09:15

## 2019-04-27 RX ADMIN — TAZOBACTAM SODIUM AND PIPERACILLIN SODIUM 4.5 G: 500; 4 INJECTION, SOLUTION INTRAVENOUS at 05:53

## 2019-04-27 RX ADMIN — SODIUM CHLORIDE 125 ML/HR: 9 INJECTION, SOLUTION INTRAVENOUS at 02:26

## 2019-04-27 RX ADMIN — TAZOBACTAM SODIUM AND PIPERACILLIN SODIUM 4.5 G: 500; 4 INJECTION, SOLUTION INTRAVENOUS at 14:00

## 2019-04-28 VITALS
BODY MASS INDEX: 27.4 KG/M2 | OXYGEN SATURATION: 91 % | HEIGHT: 69 IN | HEART RATE: 73 BPM | DIASTOLIC BLOOD PRESSURE: 94 MMHG | SYSTOLIC BLOOD PRESSURE: 118 MMHG | TEMPERATURE: 98.5 F | RESPIRATION RATE: 20 BRPM | WEIGHT: 185 LBS

## 2019-04-28 LAB
ALBUMIN SERPL-MCNC: 2.7 G/DL (ref 3.5–5.2)
ALBUMIN/GLOB SERPL: 0.9 G/DL
ALP SERPL-CCNC: 88 U/L (ref 39–117)
ALT SERPL W P-5'-P-CCNC: 151 U/L (ref 1–33)
ANION GAP SERPL CALCULATED.3IONS-SCNC: 10 MMOL/L
AST SERPL-CCNC: 40 U/L (ref 1–32)
BILIRUB SERPL-MCNC: 1.1 MG/DL (ref 0.2–1.2)
BUN BLD-MCNC: 11 MG/DL (ref 6–20)
BUN/CREAT SERPL: 27.5 (ref 7–25)
CALCIUM SPEC-SCNC: 9 MG/DL (ref 8.6–10.5)
CHLORIDE SERPL-SCNC: 106 MMOL/L (ref 98–107)
CO2 SERPL-SCNC: 26 MMOL/L (ref 22–29)
CREAT BLD-MCNC: 0.4 MG/DL (ref 0.57–1)
DEPRECATED RDW RBC AUTO: 54.5 FL (ref 37–54)
ERYTHROCYTE [DISTWIDTH] IN BLOOD BY AUTOMATED COUNT: 15.2 % (ref 12.3–15.4)
GFR SERPL CREATININE-BSD FRML MDRD: >150 ML/MIN/1.73
GLOBULIN UR ELPH-MCNC: 3 GM/DL
GLUCOSE BLD-MCNC: 119 MG/DL (ref 65–99)
HCT VFR BLD AUTO: 31.5 % (ref 34–46.6)
HGB BLD-MCNC: 10.3 G/DL (ref 12–15.9)
MCH RBC QN AUTO: 31.6 PG (ref 26.6–33)
MCHC RBC AUTO-ENTMCNC: 32.7 G/DL (ref 31.5–35.7)
MCV RBC AUTO: 96.6 FL (ref 79–97)
PLATELET # BLD AUTO: 210 10*3/MM3 (ref 140–450)
PMV BLD AUTO: 11.4 FL (ref 6–12)
POTASSIUM BLD-SCNC: 3.6 MMOL/L (ref 3.5–5.2)
PROT SERPL-MCNC: 5.7 G/DL (ref 6–8.5)
RBC # BLD AUTO: 3.26 10*6/MM3 (ref 3.77–5.28)
SODIUM BLD-SCNC: 142 MMOL/L (ref 136–145)
WBC NRBC COR # BLD: 12.13 10*3/MM3 (ref 3.4–10.8)

## 2019-04-28 PROCEDURE — 25010000002 FLUCONAZOLE PER 200 MG: Performed by: INTERNAL MEDICINE

## 2019-04-28 PROCEDURE — 25010000002 PIPERACILLIN SOD-TAZOBACTAM PER 1 G: Performed by: INTERNAL MEDICINE

## 2019-04-28 PROCEDURE — 25010000002 METHYLNALTREXONE 12 MG/0.6ML SOLUTION: Performed by: SURGERY

## 2019-04-28 PROCEDURE — 85027 COMPLETE CBC AUTOMATED: CPT | Performed by: INTERNAL MEDICINE

## 2019-04-28 PROCEDURE — 99233 SBSQ HOSP IP/OBS HIGH 50: CPT | Performed by: INTERNAL MEDICINE

## 2019-04-28 PROCEDURE — 80053 COMPREHEN METABOLIC PANEL: CPT | Performed by: INTERNAL MEDICINE

## 2019-04-28 RX ORDER — MORPHINE SULFATE 2 MG/ML
2 INJECTION, SOLUTION INTRAMUSCULAR; INTRAVENOUS EVERY 4 HOURS PRN
Status: DISCONTINUED | OUTPATIENT
Start: 2019-04-28 | End: 2019-04-28 | Stop reason: HOSPADM

## 2019-04-28 RX ORDER — LIDOCAINE HYDROCHLORIDE 10 MG/ML
3.2 INJECTION, SOLUTION EPIDURAL; INFILTRATION; INTRACAUDAL; PERINEURAL EVERY 24 HOURS
Status: DISCONTINUED | OUTPATIENT
Start: 2019-04-28 | End: 2019-04-28 | Stop reason: HOSPADM

## 2019-04-28 RX ORDER — ERTAPENEM 1 G/1
1 INJECTION, POWDER, LYOPHILIZED, FOR SOLUTION INTRAMUSCULAR; INTRAVENOUS EVERY 24 HOURS
Status: DISCONTINUED | OUTPATIENT
Start: 2019-04-28 | End: 2019-04-28 | Stop reason: HOSPADM

## 2019-04-28 RX ORDER — FLUCONAZOLE 2 MG/ML
200 INJECTION, SOLUTION INTRAVENOUS DAILY
Status: DISCONTINUED | OUTPATIENT
Start: 2019-04-29 | End: 2019-04-28 | Stop reason: HOSPADM

## 2019-04-28 RX ORDER — POVIDONE-IODINE 10 MG/G
OINTMENT TOPICAL
Status: DISCONTINUED | OUTPATIENT
Start: 2019-04-28 | End: 2019-04-28 | Stop reason: HOSPADM

## 2019-04-28 RX ADMIN — FLUCONAZOLE 400 MG: 2 INJECTION, SOLUTION INTRAVENOUS at 09:38

## 2019-04-28 RX ADMIN — TAZOBACTAM SODIUM AND PIPERACILLIN SODIUM 4.5 G: 500; 4 INJECTION, SOLUTION INTRAVENOUS at 05:17

## 2019-04-28 RX ADMIN — POTASSIUM CHLORIDE 40 MEQ: 750 CAPSULE, EXTENDED RELEASE ORAL at 14:47

## 2019-04-28 RX ADMIN — SODIUM CHLORIDE 125 ML/HR: 9 INJECTION, SOLUTION INTRAVENOUS at 03:44

## 2019-04-28 RX ADMIN — METHYLNALTREXONE BROMIDE 6 MG: 12 INJECTION, SOLUTION SUBCUTANEOUS at 09:39

## 2019-04-28 RX ADMIN — CELECOXIB 200 MG: 200 CAPSULE ORAL at 09:38

## 2019-04-28 RX ADMIN — POTASSIUM CHLORIDE 40 MEQ: 750 CAPSULE, EXTENDED RELEASE ORAL at 09:39

## 2019-04-28 RX ADMIN — TAZOBACTAM SODIUM AND PIPERACILLIN SODIUM 4.5 G: 500; 4 INJECTION, SOLUTION INTRAVENOUS at 14:46

## 2019-04-29 ENCOUNTER — TELEPHONE (OUTPATIENT)
Dept: WOUND CARE | Facility: HOSPITAL | Age: 39
End: 2019-04-29

## 2019-04-29 LAB
CYTO UR: NORMAL
LAB AP CASE REPORT: NORMAL
LAB AP CLINICAL INFORMATION: NORMAL
PATH REPORT.FINAL DX SPEC: NORMAL
PATH REPORT.GROSS SPEC: NORMAL

## 2019-04-29 NOTE — TELEPHONE ENCOUNTER
Pt called to report her colostomy is leaking and she has no appliances. She states she went to  [facility] yesterday where her appliance was changed. I offered her an appointment with the [facility] nurse at [facility] with a physician's order. She states she will not return to this hospital for care; she checked out AMA yesterday. Pt advised I will send e-mails to our appliance reps, requesting samples be sent to her home, which I did.

## 2019-04-30 LAB
BACTERIA SPEC AEROBE CULT: NORMAL
BACTERIA SPEC AEROBE CULT: NORMAL

## 2019-12-17 ENCOUNTER — APPOINTMENT (OUTPATIENT)
Dept: CT IMAGING | Facility: HOSPITAL | Age: 39
End: 2019-12-17

## 2019-12-17 ENCOUNTER — HOSPITAL ENCOUNTER (EMERGENCY)
Facility: HOSPITAL | Age: 39
Discharge: HOME | End: 2019-12-17
Attending: EMERGENCY MEDICINE | Admitting: EMERGENCY MEDICINE

## 2019-12-17 VITALS
WEIGHT: 163 LBS | HEART RATE: 87 BPM | BODY MASS INDEX: 27.83 KG/M2 | SYSTOLIC BLOOD PRESSURE: 118 MMHG | HEIGHT: 64 IN | RESPIRATION RATE: 16 BRPM | DIASTOLIC BLOOD PRESSURE: 82 MMHG | OXYGEN SATURATION: 97 % | TEMPERATURE: 98.1 F

## 2019-12-17 DIAGNOSIS — S06.0X0A CONCUSSION WITHOUT LOSS OF CONSCIOUSNESS, INITIAL ENCOUNTER: Primary | ICD-10-CM

## 2019-12-17 PROCEDURE — 70450 CT HEAD/BRAIN W/O DYE: CPT

## 2019-12-17 PROCEDURE — 99284 EMERGENCY DEPT VISIT MOD MDM: CPT

## 2019-12-17 RX ORDER — ONDANSETRON 4 MG/1
4 TABLET, ORALLY DISINTEGRATING ORAL EVERY 6 HOURS PRN
Qty: 15 TABLET | Refills: 0 | Status: SHIPPED | OUTPATIENT
Start: 2019-12-17
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
## 2022-06-03 NOTE — ANESTHESIA POSTPROCEDURE EVALUATION
Patient: Onelia Linton    Procedure Summary     Date:  04/26/19 [facility] / [facility]:   [facility]    Anesthesia Start:  1445 Anesthesia Stop:      Procedure:  DIAGNOSTIC LAPAROSCOPY, POSSIBLE OPEN, POSSIBLE BOWEL RESCECTION (N/A Abdomen) Diagnosis:      Surgeon:  Federico Huang MD Provider:  Juan Miguel Serrano MD    Anesthesia Type:  Not recorded ASA Status:  3 - Emergent          Anesthesia Type: No value filed.  Last vitals  BP   120/76 (04/26/19 1418)   Temp   97.8 °F (36.6 °C) (04/26/19 1418)   Pulse   97 (04/26/19 1418)   Resp   18 (04/26/19 1418)     SpO2   (!) 82 % (04/26/19 1418)     Post Anesthesia Care and Evaluation    Patient location during evaluation: PACU  Patient participation: complete - patient participated  Level of consciousness: awake and alert  Pain score: 0  Pain management: adequate  Airway patency: patent  Anesthetic complications: No anesthetic complications  PONV Status: none  Cardiovascular status: hemodynamically stable and acceptable  Respiratory status: nonlabored ventilation, acceptable and nasal cannula  Hydration status: acceptable      
None

## 2022-11-14 ENCOUNTER — APPOINTMENT (OUTPATIENT)
Dept: MRI IMAGING | Facility: HOSPITAL | Age: 42
End: 2022-11-14

## 2022-11-14 ENCOUNTER — APPOINTMENT (OUTPATIENT)
Dept: CT IMAGING | Facility: HOSPITAL | Age: 42
End: 2022-11-14

## 2022-11-14 ENCOUNTER — HOSPITAL ENCOUNTER (EMERGENCY)
Facility: HOSPITAL | Age: 42
Discharge: HOME | End: 2022-11-14
Attending: EMERGENCY MEDICINE | Admitting: EMERGENCY MEDICINE

## 2022-11-14 VITALS
HEART RATE: 101 BPM | RESPIRATION RATE: 20 BRPM | WEIGHT: 193 LBS | OXYGEN SATURATION: 96 % | HEIGHT: 69 IN | SYSTOLIC BLOOD PRESSURE: 148 MMHG | DIASTOLIC BLOOD PRESSURE: 81 MMHG | TEMPERATURE: 98.1 F | BODY MASS INDEX: 28.58 KG/M2

## 2022-11-14 DIAGNOSIS — M54.50 ACUTE MIDLINE LOW BACK PAIN WITHOUT SCIATICA: Primary | ICD-10-CM

## 2022-11-14 DIAGNOSIS — R32 URINARY INCONTINENCE, UNSPECIFIED TYPE: ICD-10-CM

## 2022-11-14 PROCEDURE — 74176 CT ABD & PELVIS W/O CONTRAST: CPT

## 2022-11-14 PROCEDURE — 99282 EMERGENCY DEPT VISIT SF MDM: CPT

## 2022-11-14 PROCEDURE — 72148 MRI LUMBAR SPINE W/O DYE: CPT

## 2022-11-14 RX ORDER — CEPHALEXIN 500 MG/1
500 CAPSULE ORAL 4 TIMES DAILY
Qty: 40 CAPSULE | Refills: 0 | Status: SHIPPED | OUTPATIENT
Start: 2022-11-14 | End: 2022-11-24

## 2022-11-14 NOTE — ED PROVIDER NOTES
[facility]    EMERGENCY DEPARTMENT ENCOUNTER      Pt Name: Onelia Linton  MRN: [id number]  YOB: 1980  Date of evaluation: 11/14/2022  Provider: Arun La MD    CHIEF COMPLAINT       Chief Complaint   Patient presents with   • Back Pain         HISTORY OF PRESENT ILLNESS  (Location/Symptom, Timing/Onset, Context/Setting, Quality, Duration, Modifying Factors, Severity.)   Onelia Linton is a 42 y.o. female who presents to the emergency department with moderate severity aching low back pain.  Concerned about the possibility of a kidney infection.  She has endorse some urinary incontinence as well.  Patient states that around 6 PM she started eating and have not abdominal pain and cramping.  She was seen at [facility] before coming here and wants to make sure nothing is wrong with her colon.  She has a colostomy.  She denies any obvious inciting or modifying factors associated with her symptoms.      Nursing notes were reviewed.    REVIEW OF SYSTEMS    (2-9 systems for level 4, 10 or more for level 5)   ROS:  General:  No fevers, no chills, no weakness  Cardiovascular:  No chest pain, no palpitations  Respiratory:  No shortness of breath, no cough, no wheezing  Gastrointestinal: Abdominal cramping  Musculoskeletal: Low back pain  Skin:  No rash  Neurologic:  No speech problems, no headache, no extremity numbness, no extremity tingling, no extremity weakness  Psychiatric:  No anxiety  Genitourinary:  No dysuria, no hematuria    Except as noted above the remainder of the review of systems was reviewed and negative.       PAST MEDICAL HISTORY     Past Medical History:   Diagnosis Date   • Chiari Frommel syndrome    • COPD (chronic obstructive pulmonary disease) (CMS/HCC)    • Diverticulitis    • Hepatitis C    • PTSD (post-traumatic stress disorder)          SURGICAL HISTORY       Past Surgical History:   Procedure Laterality Date   • DIAGNOSTIC LAPAROSCOPY N/A 4/26/2019    Procedure: LAPAROSCOPY  "OPEN;  Surgeon: Federico Huang MD;  Location: [facility];  Service: General   • HYSTERECTOMY     • LEEP           CURRENT MEDICATIONS     No current facility-administered medications for this encounter.    Current Outpatient Medications:   •  aspirin 81 MG EC tablet, Take 81 mg by mouth Daily., Disp: , Rfl:   •  ondansetron ODT (ZOFRAN-ODT) 4 MG disintegrating tablet, Take 1 tablet by mouth Every 8 (Eight) Hours As Needed for Nausea., Disp: 12 tablet, Rfl: 0  •  ondansetron ODT (ZOFRAN-ODT) 4 MG disintegrating tablet, Take 1 tablet by mouth Every 6 (Six) Hours As Needed for Nausea or Vomiting., Disp: 15 tablet, Rfl: 0  •  vitamin D (ERGOCALCIFEROL) 72785 units capsule capsule, Take 50,000 Units by mouth Every 14 (Fourteen) Days., Disp: , Rfl:     ALLERGIES     Macrobid [nitrofurantoin macrocrystal] and Promethazine    FAMILY HISTORY     No family history on file.       SOCIAL HISTORY       Social History     Socioeconomic History   • Marital status: Legally    Tobacco Use   • Smoking status: Every Day     Packs/day: 1.00     Types: Cigarettes   • Smokeless tobacco: Never   Substance and Sexual Activity   • Alcohol use: No   • Drug use: Yes     Types: Marijuana   • Sexual activity: Defer         PHYSICAL EXAM    (up to 7 for level 4, 8 or more for level 5)     Vitals:    11/14/22 0147   BP: 148/81   BP Location: Left arm   Patient Position: Sitting   Pulse: 101   Resp: 20   Temp: 98.1 °F (36.7 °C)   TempSrc: Oral   SpO2: 96%   Weight: 87.5 kg (193 lb)   Height: 175.3 cm (69\")       Physical Exam  General: Awake, alert, no acute distress.  HEENT: Conjunctiva normal.  Neck: Trachea midline.  Cardiac: Heart regular rate, rhythm, no murmurs, rubs, or gallops  Lungs: Lungs are clear to auscultation, there is no wheezing, rhonchi, or rales. There is no use of accessory muscles.  Chest wall: There is no tenderness to palpation over the chest wall or over ribs  Abdomen: Mild generalized tenderness.  No focal " finding, distention, rebound, or guarding.  Musculoskeletal: Mild bilateral low back tenderness.  No midline tenderness or step-off.. There is no asymmetry of BLE. DP/PT pulses are 2+ bilaterally.  Neuro: Motor and sensory function intact in BLE. There is no saddle anesthesia. Patellar/achilles reflexes are 1+ bilaterally.  Dermatology: Skin is warm and dry  Psych: Mentation is grossly normal, cognition is grossly normal. Affect is appropriate.        DIAGNOSTIC RESULTS     EKG: All EKGs are interpreted by the Emergency Department Physician who either signs or Co-signs this chart in the absence of a cardiologist.    No orders to display       RADIOLOGY:   Non-plain film images such as CT, Ultrasound and MRI are read by the radiologist. Plain radiographic images are visualized and preliminarily interpreted by the emergency physician with the below findings:      [x] Radiologist's Report Reviewed:  MRI Lumbar Spine Without Contrast   Final Result   No acute abnormality.      Degenerative changes resulting in no significant spinal canal stenosis and mild/moderate neural foraminal narrowing at L5-S1 bilaterally.      Electronically signed by:  Nathaniel Villegas M.D.     11/14/2022 2:24 AM Mountain Time      CT Abdomen Pelvis Without Contrast   Final Result      1. No kidney stones, ureteral stones, or evidence of urinary obstruction.   2. A cause for back pain is not identified.   3. Descending colostomy.   4. The liver and spleen are at the upper limit of normal in size.   5. Small ventral midline abdominal hernias containing only fat.      Electronically signed by:  Kareem Beach M.D.     11/14/2022 12:40 AM Mountain Time            ED BEDSIDE ULTRASOUND:   Performed by ED Physician - none    LABS:    I have reviewed and interpreted all of the currently available lab results from this visit (if applicable):  Results for orders placed or performed during the hospital encounter of 04/25/19   Blood Culture - Blood, Arm,  Left    Specimen: Arm, Left; Blood   Result Value Ref Range    Blood Culture No growth at 5 days    Blood Culture - Blood, Arm, Left    Specimen: Arm, Left; Blood   Result Value Ref Range    Blood Culture No growth at 5 days    Urine Culture - Urine, Urine, Catheter    Specimen: Urine, Catheter   Result Value Ref Range    Urine Culture No growth    Comprehensive Metabolic Panel    Specimen: Blood   Result Value Ref Range    Glucose 166 (H) 65 - 99 mg/dL    BUN 16 6 - 20 mg/dL    Creatinine 0.58 0.57 - 1.00 mg/dL    Sodium 141 136 - 145 mmol/L    Potassium 3.5 3.5 - 5.2 mmol/L    Chloride 102 98 - 107 mmol/L    CO2 26.0 22.0 - 29.0 mmol/L    Calcium 9.6 8.6 - 10.5 mg/dL    Total Protein 7.2 6.0 - 8.5 g/dL    Albumin 3.90 3.50 - 5.20 g/dL    ALT (SGPT) 552 (H) 1 - 33 U/L    AST (SGOT) 180 (H) 1 - 32 U/L    Alkaline Phosphatase 165 (H) 39 - 117 U/L    Total Bilirubin 2.3 (H) 0.2 - 1.2 mg/dL    eGFR Non African Amer 118 >60 mL/min/1.73    Globulin 3.3 gm/dL    A/G Ratio 1.2 g/dL    BUN/Creatinine Ratio 27.6 (H) 7.0 - 25.0    Anion Gap 13.0 mmol/L   Lipase    Specimen: Blood   Result Value Ref Range    Lipase 7 (L) 13 - 60 U/L   Urinalysis With Microscopic If Indicated (No Culture) - Urine, Clean Catch    Specimen: Urine, Clean Catch   Result Value Ref Range    Color, UA Orange (A) Yellow, Straw    Appearance, UA Cloudy (A) Clear    pH, UA <=5.0 5.0 - 8.0    Specific Gravity, UA 1.028 1.001 - 1.030    Glucose, UA Negative Negative    Ketones, UA Negative Negative    Bilirubin, UA Small (1+) (A) Negative    Blood, UA Negative Negative    Protein, UA Negative Negative    Leuk Esterase, UA Trace (A) Negative    Nitrite, UA Positive (A) Negative    Urobilinogen, UA 1.0 E.U./dL 0.2 - 1.0 E.U./dL   CBC Auto Differential    Specimen: Blood   Result Value Ref Range    WBC 23.28 (H) 3.40 - 10.80 10*3/mm3    RBC 4.41 3.77 - 5.28 10*6/mm3    Hemoglobin 14.2 12.0 - 15.9 g/dL    Hematocrit 42.2 34.0 - 46.6 %    MCV 95.7 79.0 - 97.0  fL    MCH 32.2 26.6 - 33.0 pg    MCHC 33.6 31.5 - 35.7 g/dL    RDW 15.7 (H) 12.3 - 15.4 %    RDW-SD 54.7 (H) 37.0 - 54.0 fl    MPV 11.5 6.0 - 12.0 fL    Platelets 226 140 - 450 10*3/mm3    Neutrophil % 89.1 (H) 42.7 - 76.0 %    Lymphocyte % 6.4 (L) 19.6 - 45.3 %    Monocyte % 4.5 (L) 5.0 - 12.0 %    Eosinophil % 0.0 (L) 0.3 - 6.2 %    Basophil % 0.0 0.0 - 1.5 %    Immature Grans % 0.4 0.0 - 0.5 %    Neutrophils, Absolute 20.73 (H) 1.70 - 7.00 10*3/mm3    Lymphocytes, Absolute 1.48 0.70 - 3.10 10*3/mm3    Monocytes, Absolute 1.05 (H) 0.10 - 0.90 10*3/mm3    Eosinophils, Absolute 0.01 0.00 - 0.40 10*3/mm3    Basophils, Absolute 0.01 0.00 - 0.20 10*3/mm3    Immature Grans, Absolute 0.09 (H) 0.00 - 0.05 10*3/mm3   Urinalysis, Microscopic Only - Urine, Clean Catch    Specimen: Urine, Clean Catch   Result Value Ref Range    RBC, UA 0-2 None Seen, 0-2 /HPF    WBC, UA 6-12 (A) None Seen, 0-2 /HPF    Bacteria, UA None Seen None Seen, Trace /HPF    Squamous Epithelial Cells, UA 0-2 None Seen, 0-2 /HPF    Hyaline Casts, UA 0-6 0 - 6 /LPF    Methodology Automated Microscopy    Procalcitonin    Specimen: Blood   Result Value Ref Range    Procalcitonin 1.42 (H) 0.10 - 0.25 ng/mL   CBC (No Diff)    Specimen: Blood   Result Value Ref Range    WBC 15.36 (H) 3.40 - 10.80 10*3/mm3    RBC 3.87 3.77 - 5.28 10*6/mm3    Hemoglobin 12.3 12.0 - 15.9 g/dL    Hematocrit 37.8 34.0 - 46.6 %    MCV 97.7 (H) 79.0 - 97.0 fL    MCH 31.8 26.6 - 33.0 pg    MCHC 32.5 31.5 - 35.7 g/dL    RDW 16.2 (H) 12.3 - 15.4 %    RDW-SD 58.1 (H) 37.0 - 54.0 fl    MPV 11.4 6.0 - 12.0 fL    Platelets 210 140 - 450 10*3/mm3   Potassium    Specimen: Blood   Result Value Ref Range    Potassium 3.3 (L) 3.5 - 5.2 mmol/L   Potassium    Specimen: Blood   Result Value Ref Range    Potassium 3.7 3.5 - 5.2 mmol/L   CBC (No Diff)    Specimen: Blood   Result Value Ref Range    WBC 11.47 (H) 3.40 - 10.80 10*3/mm3    RBC 3.50 (L) 3.77 - 5.28 10*6/mm3    Hemoglobin 11.2 (L) 12.0 -  15.9 g/dL    Hematocrit 33.4 (L) 34.0 - 46.6 %    MCV 95.4 79.0 - 97.0 fL    MCH 32.0 26.6 - 33.0 pg    MCHC 33.5 31.5 - 35.7 g/dL    RDW 15.5 (H) 12.3 - 15.4 %    RDW-SD 53.8 37.0 - 54.0 fl    MPV 11.0 6.0 - 12.0 fL    Platelets 175 140 - 450 10*3/mm3   Basic Metabolic Panel    Specimen: Blood   Result Value Ref Range    Glucose 136 (H) 65 - 99 mg/dL    BUN 10 6 - 20 mg/dL    Creatinine 0.49 (L) 0.57 - 1.00 mg/dL    Sodium 139 136 - 145 mmol/L    Potassium 3.7 3.5 - 5.2 mmol/L    Chloride 104 98 - 107 mmol/L    CO2 26.0 22.0 - 29.0 mmol/L    Calcium 8.7 8.6 - 10.5 mg/dL    eGFR Non African Amer 143 >60 mL/min/1.73    BUN/Creatinine Ratio 20.4 7.0 - 25.0    Anion Gap 9.0 mmol/L   Comprehensive Metabolic Panel    Specimen: Blood   Result Value Ref Range    Glucose 119 (H) 65 - 99 mg/dL    BUN 11 6 - 20 mg/dL    Creatinine 0.40 (L) 0.57 - 1.00 mg/dL    Sodium 142 136 - 145 mmol/L    Potassium 3.6 3.5 - 5.2 mmol/L    Chloride 106 98 - 107 mmol/L    CO2 26.0 22.0 - 29.0 mmol/L    Calcium 9.0 8.6 - 10.5 mg/dL    Total Protein 5.7 (L) 6.0 - 8.5 g/dL    Albumin 2.70 (L) 3.50 - 5.20 g/dL    ALT (SGPT) 151 (H) 1 - 33 U/L    AST (SGOT) 40 (H) 1 - 32 U/L    Alkaline Phosphatase 88 39 - 117 U/L    Total Bilirubin 1.1 0.2 - 1.2 mg/dL    eGFR Non African Amer >150 >60 mL/min/1.73    Globulin 3.0 gm/dL    A/G Ratio 0.9 g/dL    BUN/Creatinine Ratio 27.5 (H) 7.0 - 25.0    Anion Gap 10.0 mmol/L   CBC (No Diff)    Specimen: Blood   Result Value Ref Range    WBC 12.13 (H) 3.40 - 10.80 10*3/mm3    RBC 3.26 (L) 3.77 - 5.28 10*6/mm3    Hemoglobin 10.3 (L) 12.0 - 15.9 g/dL    Hematocrit 31.5 (L) 34.0 - 46.6 %    MCV 96.6 79.0 - 97.0 fL    MCH 31.6 26.6 - 33.0 pg    MCHC 32.7 31.5 - 35.7 g/dL    RDW 15.2 12.3 - 15.4 %    RDW-SD 54.5 (H) 37.0 - 54.0 fl    MPV 11.4 6.0 - 12.0 fL    Platelets 210 140 - 450 10*3/mm3   Tissue Pathology Exam    Specimen: Large Intestine, Sigmoid Colon; Tissue   Result Value Ref Range    Case Report       Surgical  Pathology Report                         Case: [id number]                                  Authorizing Provider:  Federico Huang MD Collected:           04/26/2019 04:11 PM          Ordering Location:     [facility]   Received:            04/27/2019 04:20 AM                                 OR                                                                           Pathologist:           Richard Coe MD                                                           Specimen:    Large Intestine, Sigmoid Colon, sigmoid colon for permanent                                Clinical Information       The working history is diverticulitis.       Final Diagnosis       SIGMOID COLON, SEGMENTAL RESECTION:  Diverticulosis with diverticulitis and perforation.    DGD/[facility]       Gross Description       Received in formalin labeled as sigmoid colon is a segment of sigmoid colon and attached adipose tissue measuring 9.5 cm in length by 5.5 cm in diameter. There is an exudate present over a portion of the external surface. The bowel is partially opened at time of receipt. The mucosa shows normal folds with no mass lesions identified. On sectioning, there is evidence of diverticular disease. Focally, there is firm inspissated stool in the adipose tissue outside the normal lumen of the bowel consistent with a diverticulum. Near this area there is also discoloration of the bowel wall with a blackened dark color noted on the mucosa and the muscular wall. This area appears to represent an area of possible perforation. This is not directly associated with the diverticulum, but is within the same region of the bowel as the most prominent noted diverticulum. Additional diverticula are seen in the adjacent tissue. No mass lesions are identified. Representative sections are submitted as follows: A-B - darkly discolored area with possible perforation; C - adjacent diverticulum; D - separate diverticulum; E - most prominent  "diverticulum.   PCC/klb       Microscopic Description       Sections of the segmental resection from the sigmoid show the proximal and distal margins of excision to be viable. The segment show multiple diverticula with at least one having perforated producing an acute fibrinoinflammatory serosal exudate in which fragments of fecal material can be identified.      Light Blue Top   Result Value Ref Range    Extra Tube hold for add-on    Green Top (Gel)   Result Value Ref Range    Extra Tube Hold for add-ons.    Lavender Top   Result Value Ref Range    Extra Tube hold for add-on    Gold Top - SST   Result Value Ref Range    Extra Tube Hold for add-ons.         All other labs were within normal range or not returned as of this dictation.      EMERGENCY DEPARTMENT COURSE and DIFFERENTIAL DIAGNOSIS/MDM:   Vitals:    Vitals:    11/14/22 0147   BP: 148/81   BP Location: Left arm   Patient Position: Sitting   Pulse: 101   Resp: 20   Temp: 98.1 °F (36.7 °C)   TempSrc: Oral   SpO2: 96%   Weight: 87.5 kg (193 lb)   Height: 175.3 cm (69\")            No emergent pathology can be identified on patient's extensive ED work-up.  MRI of the L-spine demonstrates no evidence of cord compression/cauda equina/conus medullaris.  CT abdomen pelvis demonstrates no acute intra-abdominal pathology including no evidence of obstruction, perforation, renal inflammation, appendicitis, or bili pathology.  Labs are likewise reassuring.  She is appropriate for discharge home at this time with close outpatient follow-up.    I had a discussion with the patient/family regarding diagnosis, diagnostic results, treatment plan, and medications.  The patient/family indicated understanding of these instructions.  I spent adequate time at the bedside preceding discharge necessary to personally discuss the aftercare instructions, giving patient education, providing explanations of the results of our evaluations/findings, and my decision making to assure that " the patient/family understand the plan of care.  Time was allotted to answer questions at that time and throughout the ED course.  Emphasis was placed on timely follow-up after discharge.  I also discussed the potential for the development of an acute emergent condition requiring further evaluation, admission, or even surgical intervention. I discussed that we found nothing during the visit today indicating the need for further workup, admission, or the presence of an unstable medical condition.  I encouraged the patient to return to the emergency department immediately for ANY concerns, worsening, new complaints, or if symptoms persist and unable to seek follow-up in a timely fashion.  The patient/family expressed understanding and agreement with this plan.  The patient will follow-up with their PCP in 1-2 days for reevaluation.       MEDICATIONS ADMINISTERED IN ED:  Medications - No data to display    PROCEDURES:  Procedures    CRITICAL CARE TIME    Total Critical Care time was 0 minutes, excluding separately reportable procedures.   There was a high probability of clinically significant/life threatening deterioration in the patient's condition which required my urgent intervention.      FINAL IMPRESSION      1. Acute midline low back pain without sciatica    2. Urinary incontinence, unspecified type          DISPOSITION/PLAN     ED Disposition     ED Disposition   Discharge    Condition   Stable    Comment   --             PATIENT REFERRED TO:  Eulogio Lobo PA   [address] [facility] [address]  [phone]    Schedule an appointment as soon as possible for a visit in 2 days      [facility] Emergency Department  [address] [facility] [address] [phone]    If symptoms worsen      DISCHARGE MEDICATIONS:     Medication List      CONTINUE taking these medications    aspirin 81 MG EC tablet     * ondansetron ODT 4 MG disintegrating tablet  Commonly known as:  ZOFRAN-ODT  Take 1 tablet by mouth Every 8 (Eight) Hours As Needed for Nausea.     * ondansetron ODT 4 MG disintegrating tablet  Commonly known as: ZOFRAN-ODT  Take 1 tablet by mouth Every 6 (Six) Hours As Needed for Nausea or Vomiting.     vitamin D 1.25 MG (29364 UT) capsule capsule  Commonly known as: ERGOCALCIFEROL         * This list has 2 medication(s) that are the same as other medications prescribed for you. Read the directions carefully, and ask your doctor or other care provider to review them with you.            ASK your doctor about these medications    cephalexin 500 MG capsule  Commonly known as: KEFLEX  Take 1 capsule by mouth 4 (Four) Times a Day for 10 days.  Ask about: Should I take this medication?           Where to Get Your Medications      These medications were sent to [facility] [address] [place] [address] [phone]  - [phone] [place] [address]    Phone: [phone]   · cephalexin 500 MG capsule             Comment: Please note this report has been produced using speech recognition software.      Arun La MD  Attending Emergency Physician               Arun La MD  11/27/22 2004

## (undated) DEVICE — SUT MNCRYL PLS ANTIB UD 4/0 PS2 18IN

## (undated) DEVICE — POOLE SUCTION INSTRUMENT WITH REMOVABLE SHEATH: Brand: POOLE

## (undated) DEVICE — SUT PDS LP 1 TP1 96IN VIO PDP880GA

## (undated) DEVICE — HLDR CATH FOLEY STATLOCK PUSH TAB RETAINER

## (undated) DEVICE — ELECTRD BLD EXT EDGE 1P COAT 6.5IN STRL

## (undated) DEVICE — INTENDED FOR TISSUE SEPARATION, AND OTHER PROCEDURES THAT REQUIRE A SHARP SURGICAL BLADE TO PUNCTURE OR CUT.: Brand: BARD-PARKER ® STAINLESS STEEL BLADES

## (undated) DEVICE — ANTIBACTERIAL UNDYED BRAIDED (POLYGLACTIN 910), SYNTHETIC ABSORBABLE SUTURE: Brand: COATED VICRYL

## (undated) DEVICE — TOTAL TRAY, 16FR 10ML SIL FOLEY, URN: Brand: MEDLINE

## (undated) DEVICE — SUT SILK 3/0 SH CR8 18IN C013D

## (undated) DEVICE — [HIGH FLOW HEATED INSUFFLATOR TUBING,  DO NOT USE IF PACKAGE IS DAMAGED]

## (undated) DEVICE — ENCORE® LATEX MICRO SIZE 7.5, STERILE LATEX POWDER-FREE SURGICAL GLOVE: Brand: ENCORE

## (undated) DEVICE — TUBING, SUCTION, 1/4" X 10', STRAIGHT: Brand: MEDLINE

## (undated) DEVICE — ENCORE® LATEX MICRO SIZE 8, STERILE LATEX POWDER-FREE SURGICAL GLOVE: Brand: ENCORE

## (undated) DEVICE — TOWEL,OR,DSP,ST,BLUE,STD,4/PK,20PK/CS: Brand: MEDLINE

## (undated) DEVICE — MEDI-VAC YANKAUER SUCTION HANDLE: Brand: CARDINAL HEALTH

## (undated) DEVICE — PK LAP LASR CHOLE 10

## (undated) DEVICE — SUT PROLN 2/0 PC3 8833H

## (undated) DEVICE — COVADERM PLUS: Brand: DEROYAL

## (undated) DEVICE — COVER,LIGHT HANDLE,FLX,1/PK: Brand: MEDLINE INDUSTRIES, INC.

## (undated) DEVICE — SUT VIC 0 UR6 27IN VCP603H

## (undated) DEVICE — VISUALIZATION SYSTEM: Brand: CLEARIFY

## (undated) DEVICE — ENDOPATH XCEL BLADELESS TROCARS WITH STABILITY SLEEVES: Brand: ENDOPATH XCEL

## (undated) DEVICE — PENCL E/S HNDSWCH ROCKRBTN HOLSTR 10FT

## (undated) DEVICE — CLTH CLENS READYCLEANSE PERI CARE PK/5

## (undated) DEVICE — MINI ENDOCUT SCISSOR TIP, DISPOSABLE: Brand: RENEW

## (undated) DEVICE — APPL CHLORAPREP W/TINT 26ML BLU

## (undated) DEVICE — SPNG LAP PREWSH SFTPK 18X18IN STRL PK/5

## (undated) DEVICE — PROXIMATE RH ROTATING HEAD SKIN STAPLERS (35 WIDE) CONTAINS 35 STAINLESS STEEL STAPLES: Brand: PROXIMATE